# Patient Record
Sex: MALE | Race: BLACK OR AFRICAN AMERICAN | NOT HISPANIC OR LATINO | Employment: OTHER | URBAN - METROPOLITAN AREA
[De-identification: names, ages, dates, MRNs, and addresses within clinical notes are randomized per-mention and may not be internally consistent; named-entity substitution may affect disease eponyms.]

---

## 2017-08-19 ENCOUNTER — HOSPITAL ENCOUNTER (EMERGENCY)
Facility: CLINIC | Age: 37
Discharge: HOME | End: 2017-08-19
Attending: EMERGENCY MEDICINE | Admitting: EMERGENCY MEDICINE
Payer: COMMERCIAL

## 2017-08-19 VITALS
WEIGHT: 190 LBS | HEIGHT: 73 IN | TEMPERATURE: 97.8 F | BODY MASS INDEX: 25.18 KG/M2 | DIASTOLIC BLOOD PRESSURE: 96 MMHG | SYSTOLIC BLOOD PRESSURE: 145 MMHG | HEART RATE: 69 BPM | RESPIRATION RATE: 16 BRPM | OXYGEN SATURATION: 99 %

## 2017-08-19 DIAGNOSIS — S61.212A LACERATION OF RIGHT MIDDLE FINGER WITHOUT FOREIGN BODY WITHOUT DAMAGE TO NAIL, INITIAL ENCOUNTER: ICD-10-CM

## 2017-08-19 PROCEDURE — 27210282 ZZH ADHESIVE DERMABOND SKIN

## 2017-08-19 PROCEDURE — 25000128 H RX IP 250 OP 636: Performed by: EMERGENCY MEDICINE

## 2017-08-19 PROCEDURE — 99283 EMERGENCY DEPT VISIT LOW MDM: CPT | Mod: 25

## 2017-08-19 PROCEDURE — 12001 RPR S/N/AX/GEN/TRNK 2.5CM/<: CPT

## 2017-08-19 PROCEDURE — 90471 IMMUNIZATION ADMIN: CPT

## 2017-08-19 PROCEDURE — 90715 TDAP VACCINE 7 YRS/> IM: CPT | Performed by: EMERGENCY MEDICINE

## 2017-08-19 RX ADMIN — CLOSTRIDIUM TETANI TOXOID ANTIGEN (FORMALDEHYDE INACTIVATED), CORYNEBACTERIUM DIPHTHERIAE TOXOID ANTIGEN (FORMALDEHYDE INACTIVATED), BORDETELLA PERTUSSIS TOXOID ANTIGEN (GLUTARALDEHYDE INACTIVATED), BORDETELLA PERTUSSIS FILAMENTOUS HEMAGGLUTININ ANTIGEN (FORMALDEHYDE INACTIVATED), BORDETELLA PERTUSSIS PERTACTIN ANTIGEN, AND BORDETELLA PERTUSSIS FIMBRIAE 2/3 ANTIGEN 0.5 ML: 5; 2; 2.5; 5; 3; 5 INJECTION, SUSPENSION INTRAMUSCULAR at 10:54

## 2017-08-19 ASSESSMENT — ENCOUNTER SYMPTOMS
WOUND: 1
CONSTITUTIONAL NEGATIVE: 1

## 2017-08-19 NOTE — ED AVS SNAPSHOT
Emergency Department    [address] [place] [address]    Phone:  [phone]    Fax:  [phone]                                       Hay Vail   MRN: [id number]    Department:   Emergency Department   Date of Visit:  8/19/2017           After Visit Summary Signature Page     I have received my discharge instructions, and my questions have been answered. I have discussed any challenges I see with this plan with the nurse or doctor.    ..........................................................................................................................................  Patient/Patient Representative Signature      ..........................................................................................................................................  Patient Representative Print Name and Relationship to Patient    ..................................................               ................................................  Date                                            Time    ..........................................................................................................................................  Reviewed by Signature/Title    ...................................................              ..............................................  Date                                                            Time

## 2017-08-19 NOTE — ED PROVIDER NOTES
"  History     Chief Complaint:  Laceration    HPI   Hay Vail is a 37 year old right handed male who presents with concerns for a finger laceration. This morning the patient cut his right middle finger on a TV at home about an hour prior to evaluation. Otherwise the patient appears healthy. The patient is in need of tetanus update.    Allergies:  No Known Drug Allergies    Medications:    The patient is not currently taking any prescribed medications.    Past Medical History:    Back pain    Past Surgical History:    History reviewed. No pertinent past surgical history.    Family History:    The patient denies any relevant family medical history.    Social History:  Smoking Status: Current some day smoker  Smokeless Tobacco: never  Alcohol Use: no    Marital Status:  Single [1]    Review of Systems   Constitutional: Negative.    Skin: Positive for wound.   All other systems reviewed and are negative.    Physical Exam   First Vitals:  BP: (!) 145/96  Pulse: 69  Temp: 97.8  F (36.6  C)  Resp: 16  Height: 185.4 cm (6' 1\")  Weight: 86.2 kg (190 lb)  SpO2: 99 %      Physical Exam  Nursing note and vitals reviewed.  Constitutional:  Appears well-developed and well-nourished.   Cardiovascular:  Normal rate, regular rhythm, no murmur   Pulmonary/Chest: Breath sounds are clear and equal without wheezes or crackles.  Musculoskeletal:  Right long finger exam, 2.0 cm linear laceration to the radial aspect of the    long finger near the DIP joint, no deep structure involvement, no active    Bleeding, sensation is intact distally, normal flexion and extension and    the DIP and PIP joints  Skin:    Skin is warm and dry. No rash noted. No pallor.     Emergency Department Course   Procedures:  PROCEDURE: Laceration Repair    LACERATION: A simple,clean/ 2.0 cm laceration.    LOCATION: right long finger    FUNCTION: Sensation, circulation, motor, and tendon function are intact.    PREPARATION: Cleansed with normal Saline "     DEBRIDEMENT: No debridement. Wound explored and no foreign body found.     CLOSURE: Wound was closed in Dermabond    Interventions:  1054 Tdap, 0.5 mL, intramuscular     Emergency Department Course:  Nursing notes and vitals reviewed.  I performed an exam of the patient as documented above.     Medicine administered as documented above.     1110 I performed a laceration repair with Dermabond.      Findings and plan explained to the Patient. Patient discharged home with instructions regarding supportive care, medications, and reasons to return. The importance of close follow-up was reviewed.    I personally answered all related questions prior to discharge.     Impression & Plan      Medical Decision Making:  The patient presented with a laceration on his right long finger.  The wound was carefully evaluated and explored.  The laceration was closed with Dermabond as noted herein.  There is no evidence of muscular, tendon, or bony damage with this laceration.  Possible complications (infection, scarring) were reviewed with the patient.  I also discussed signs of infection including redness, warmth, foul-smelling drainage and worsening pain and instructed the patient to return promptly to the ER for re-evaluation should any of these develop.    Diagnosis:    ICD-10-CM    1. Laceration of right middle finger without foreign body without damage to nail, initial encounter S61.212A      Disposition:  discharged to home    IJose Rafael, am serving as a scribe on 8/19/2017 at 10:22 AM to personally document services performed by Brittney Swan MD based on my observations and the provider's statements to me.     Jose Rafael Joseph  8/19/2017    EMERGENCY DEPARTMENT       Brittney Swan MD  08/19/17 1311

## 2017-08-19 NOTE — ED AVS SNAPSHOT
Emergency Department    [address] [facility] [address]    Phone:  [phone]    Fax:  [phone]                                       Hay Vail   MRN: [id number]    Department:   Emergency Department   Date of Visit:  8/19/2017           Patient Information     Date Of Birth          1980        Your diagnoses for this visit were:     Laceration of right middle finger without foreign body without damage to nail, initial encounter        You were seen by Brittney Swan MD.      Follow-up Information     Follow up with  Emergency Department.    Specialty:  EMERGENCY MEDICINE    Why:  As needed for signs of wound infection    Contact information:    [address] [facility] [address] [phone]      Discharge References/Attachments     LACERATION, EXTREMITY: SKIN GLUE (ENGLISH)      24 Hour Appointment Hotline       To make an appointment at any [facility], call [phone] ([phone]). If you don't have a family doctor or clinic, we will help you find one. [facility] (formerly [facility]3] are conveniently located to serve the needs of you and your family.             Review of your medicines      Notice     You have not been prescribed any medications.            Orders Needing Specimen Collection     None      Pending Results     No orders found from 8/17/2017 to 8/20/2017.            Pending Culture Results     No orders found from 8/17/2017 to 8/20/2017.            Pending Results Instructions     If you had any lab results that were not finalized at the time of your Discharge, you can call the ED Lab Result RN at [phone]. You will be contacted by this team for any positive Lab results or changes in treatment. The nurses are available 7 days a week from 10A to 6:30P.  You can leave a message 24 hours per day and they will return your call.        Test Results From Your Hospital Stay               Clinical Quality Measure: Blood Pressure Screening   "   Your blood pressure was checked while you were in the emergency department today. The last reading we obtained was  BP: (!) 145/96 . Please read the guidelines below about what these numbers mean and what you should do about them.  If your systolic blood pressure (the top number) is less than 120 and your diastolic blood pressure (the bottom number) is less than 80, then your blood pressure is normal. There is nothing more that you need to do about it.  If your systolic blood pressure (the top number) is 120-139 or your diastolic blood pressure (the bottom number) is 80-89, your blood pressure may be higher than it should be. You should have your blood pressure rechecked within a year by a primary care provider.  If your systolic blood pressure (the top number) is 140 or greater or your diastolic blood pressure (the bottom number) is 90 or greater, you may have high blood pressure. High blood pressure is treatable, but if left untreated over time it can put you at risk for heart attack, stroke, or kidney failure. You should have your blood pressure rechecked by a primary care provider within the next 4 weeks.  If your provider in the emergency department today gave you specific instructions to follow-up with your doctor or provider even sooner than that, you should follow that instruction and not wait for up to 4 weeks for your follow-up visit.        Thank you for choosing [facility]       Thank you for choosing [facility] for your care. Our goal is always to provide you with excellent care. Hearing back from our patients is one way we can continue to improve our services. Please take a few minutes to complete the written survey that you may receive in the mail after you visit with us. Thank you!        [employer] Information     [employer] lets you send messages to your doctor, view your test results, renew your prescriptions, schedule appointments and more. To sign up, go to [email or web address] . Click on \"Log in\" " "on the left side of the screen, which will take you to the Welcome page. Then click on \"Sign up Now\" on the right side of the page.     You will be asked to enter the access code listed below, as well as some personal information. Please follow the directions to create your username and password.     Your access code is: [email or web address]  Expires: 2017 11:18 AM     Your access code will  in 90 days. If you need help or a new code, please call your [facility] clinic or [phone].        Care EveryWhere ID     This is your Care EveryWhere ID. This could be used by other organizations to access your [facility] medical records  [id number]        Equal Access to Services     CAROLYN PEARL : Kala Sahu, walucille lynn, qajuan diego kaalmalisha chu, brandon martinez . So [facility] [phone].    ATENCIÓN: Si habla español, tiene a barba disposición servicios gratuitos de asistencia lingüística. Llame al [phone].    We comply with applicable federal civil rights laws and [place] laws. We do not discriminate on the basis of race, color, national origin, age, disability sex, sexual orientation or gender identity.            After Visit Summary       This is your record. Keep this with you and show to your community pharmacist(s) and doctor(s) at your next visit.                  "

## 2021-06-02 ENCOUNTER — RECORDS - HEALTHEAST (OUTPATIENT)
Dept: ADMINISTRATIVE | Facility: CLINIC | Age: 41
End: 2021-06-02

## 2024-07-18 ENCOUNTER — HOSPITAL ENCOUNTER (EMERGENCY)
Facility: CLINIC | Age: 44
Discharge: HOME | End: 2024-07-18
Attending: EMERGENCY MEDICINE | Admitting: EMERGENCY MEDICINE

## 2024-07-18 VITALS
SYSTOLIC BLOOD PRESSURE: 138 MMHG | DIASTOLIC BLOOD PRESSURE: 79 MMHG | HEART RATE: 98 BPM | RESPIRATION RATE: 16 BRPM | TEMPERATURE: 98.2 F | OXYGEN SATURATION: 99 %

## 2024-07-18 DIAGNOSIS — S41.111A LACERATION OF RIGHT UPPER EXTREMITY, INITIAL ENCOUNTER: ICD-10-CM

## 2024-07-18 PROCEDURE — 99282 EMERGENCY DEPT VISIT SF MDM: CPT

## 2024-07-18 PROCEDURE — 12002 RPR S/N/AX/GEN/TRNK2.6-7.5CM: CPT

## 2024-07-18 RX ORDER — IBUPROFEN 600 MG/1
600 TABLET, FILM COATED ORAL ONCE
Status: COMPLETED | OUTPATIENT
Start: 2024-07-18 | End: 2024-07-18

## 2024-07-18 RX ORDER — ACETAMINOPHEN 500 MG
1000 TABLET ORAL ONCE
Status: COMPLETED | OUTPATIENT
Start: 2024-07-18 | End: 2024-07-18

## 2024-07-18 ASSESSMENT — COLUMBIA-SUICIDE SEVERITY RATING SCALE - C-SSRS
2. HAVE YOU ACTUALLY HAD ANY THOUGHTS OF KILLING YOURSELF IN THE PAST MONTH?: NO
6. HAVE YOU EVER DONE ANYTHING, STARTED TO DO ANYTHING, OR PREPARED TO DO ANYTHING TO END YOUR LIFE?: NO
1. IN THE PAST MONTH, HAVE YOU WISHED YOU WERE DEAD OR WISHED YOU COULD GO TO SLEEP AND NOT WAKE UP?: NO

## 2024-07-18 ASSESSMENT — ACTIVITIES OF DAILY LIVING (ADL): ADLS_ACUITY_SCORE: 35

## 2024-07-18 NOTE — ED TRIAGE NOTES
Last tdap in 2017   Triage Assessment (Adult)       Row Name 07/18/24 0442          Respiratory WDL    Respiratory WDL WDL        Cardiac WDL    Cardiac Rhythm NSR        Cognitive/Neuro/Behavioral WDL    Cognitive/Neuro/Behavioral WDL WDL

## 2024-07-18 NOTE — ED TRIAGE NOTES
Pt reports he was working on his car when right wrist hit the trim of the car around 2100. Pt reports numbness to right thumb.      Triage Assessment (Adult)       Row Name 07/18/24 0442          Respiratory WDL    Respiratory WDL WDL        Cardiac WDL    Cardiac Rhythm NSR        Cognitive/Neuro/Behavioral WDL    Cognitive/Neuro/Behavioral WDL WDL

## 2024-07-18 NOTE — Clinical Note
Hay Vail was seen and treated in our emergency department on 7/18/2024.  He may return to work on 07/19/2024.  Mr. Vail was seen in the emergency department overnight. Please excuse him from work.  Please allow him to keep his wound covered to prevent infection.     If you have any questions or concerns, please don't hesitate to call.      Nelly Rivas MD

## 2024-07-18 NOTE — DISCHARGE INSTRUCTIONS
*Keep the lacerations clean.  You may wash with soap and water and apply neosporin/bacitracin.  No soaking or swimming.  *Tylenol or motrin as directed as needed for pain.  *Follow-up with your doctor for suture removal in ~10-12 days.  *Return if any symptoms of infection including fever, worsening pain, foul-smelling drainage, spreading redness or warmth or worse in any way.    Discharge Instructions  Laceration (Cut)    You were seen today for a laceration (cut).  Your provider examined your laceration for any problems such a buried foreign body (like glass, a splinter, or gravel), or injury to blood vessels, tendons, and nerves.  Your provider may have also rinsed and/or scrubbed your laceration to help prevent an infection. It may not be possible to find all problems with your laceration on the first visit; occasionally foreign bodies or a tendon injury can go undetected.    Your laceration may have been closed in one of several ways:  No closure: many wounds will heal just fine without closure.  Stitches: regular stitches that require removal.  Staples: skin staples are often used in the scalp/head.  Wound adhesive (glue): skin glue can be used for certain lacerations and doesn t require removal.  Wound strips (aka Butterfly bandages or steri-strips): these are bandages that help to close a wound.  Absorbable stitches:  dissolving  stitches that go away on their own and usually don t require removal.    A small percentage of wounds will develop an infection regardless of how well the wound is cared for. Antibiotics are generally not indicated to prevent an infection so are only given for a small number of high-risk wounds. Some lacerations are too high risk to close, and are left open to heal because closure can increase the likelihood that an infection will develop.    Remember that all lacerations, no matter how expertly repaired, will cause scarring. We consider many factors, techniques, and materials, in  our efforts to provide the best possible cosmetic outcome.    Generally, every Emergency Department visit should have a follow-up clinic visit with either a primary or a specialty clinic/provider. Please follow-up as instructed by your emergency provider today.     Return to the Emergency Department right away if:  You have more redness, swelling, pain, drainage (pus), a bad smell, or red streaking from your laceration as these symptoms could indicate an infection.  You have a fever of 100.4 F or more.  You have bleeding that you cannot stop at home. If your cut starts to bleed, hold pressure on the bleeding area with a clean cloth or put pressure over the bandage.  If the bleeding does not stop after using constant pressure for 30 minutes, you should return to the Emergency Department for further treatment.  An area past the laceration is cool, pale, or blue compared with the other side, or has a slower return of color when squeezed.  Your dressing seems too tight or starts to get uncomfortable or painful. For children, signs of a problem might be irritability or restlessness.  You have loss of normal function or use of an area, such as being unable to straighten or bend a finger normally.  You have a numb area past the laceration.    Return to the Emergency Department or see your regular provider if:  The laceration starts to come open.   You have something coming out of the cut or a feeling that there is something in the laceration.  Your wound will not heal, or keeps breaking open. There can always be glass, wood, dirt or other things in any wound.  They will not always show up, even on x-rays.  If a wound does not heal, this may be why, and it is important to follow-up with your regular provider.    Home Care:  Take your dressing off in 12-24 hours, or as instructed by your provider, to check your laceration. Remove the dressing sooner if it seems too tight or painful, or if it is getting numb, tingly, or pale  past the dressing.  Gently wash your laceration 1-2 times daily with clean water and mild soap. It is okay to shower or run clean water over the laceration, but do not let the laceration soak in water (no swimming).  If your laceration was closed with wound adhesive or strips: pat it dry and leave it open to the air. For all other repairs: after you wash your laceration, or at least 2 times a day, apply antibiotic ointment (such as Neosporin  or Bacitracin ) to the laceration, then cover it with a Band-Aid  or gauze.  Keep the laceration clean. Wear gloves or other protective clothing if you are around dirt.    Follow-up for removal:  If your wound was closed with staples or regular stitches, they need to be removed according to the instructions and timeline specified by your provider today.  If your wound was closed with absorbable ( dissolving ) sutures, they should fall out, dissolve, or not be visible in about one week. If they are still visible, then they should be removed according to the instructions and timeline specified by your provider today.    Scars:  To help minimize scarring:  Wear sunscreen over the healed laceration when out in the sun.  Massage the area regularly once healed.  You may apply Vitamin E to the healed wound.  Wait. Scars improve in appearance over months and years.    If you were given a prescription for medicine here today, be sure to read all of the information (including the package insert) that comes with your prescription.  This will include important information about the medicine, its side effects, and any warnings that you need to know about.  The pharmacist who fills the prescription can provide more information and answer questions you may have about the medicine.  If you have questions or concerns that the pharmacist cannot address, please call or return to the Emergency Department.       Remember that you can always come back to the Emergency Department if you are not able  to see your regular provider in the amount of time listed above, if you get any new symptoms, or if there is anything that worries you.

## 2024-07-19 NOTE — ED PROVIDER NOTES
History     Chief Complaint:  Laceration       HPI   Hay Vail is a 44 year old male with wearing something that is part of the axle when he scraped his arm on the right under the undercarriage of the car and sustained a laceration.  It occurred late on the previous evening.  He completed that he bandaged it but he continued to have pain and comes for evaluation of whether this requires closure or not.  He reports that his last tetanus was in the last 2 years.  No pulsatile bleeding.  He feels pain and tingling extending down his thumb.  Normal strength in his hand..    Independent Historian:    None    Review of External Notes:  I reviewed [facility].  Patient has had tetanus updated within the last 10 years per [facility].    Allergies:  No Known Allergies     Medications:    No current outpatient medications on file.      Past Medical History:    Past Medical History:   Diagnosis Date    Back pain        Past Surgical History:    No past surgical history on file.     Physical Exam   Patient Vitals for the past 24 hrs:   BP Temp Pulse Resp SpO2   07/18/24 0536 138/79 -- 98 -- 99 %   07/18/24 0442 (!) 142/93 98.2  F (36.8  C) 101 16 98 %        Physical Exam  General: Well-nourished, no acute distress  Eyes: PERRL, conjunctivae pink no scleral icterus or conjunctival injection  ENT:  Moist mucus membranes  Respiratory:  No respiratory distress  CV: Normal rate.  Symmetric radial pulse and distal capillary refill and temperature.  Skin: 3.0cm linear laceration over the radial aspect of the right wrist.  No active bleeding.  No pulsatile bleeding.  No exposed tendons or nerves.  Lacerations are superficial.  Musculoskeletal: No peripheral edema or calf tenderness.  Normal strength with flexion extension of the thumb and all fingers of the right hand.  Neuro: Alert and oriented to person/place/time.  Normal distal sensation to light touch over the thumb but patient describes tingling sensation.  Psychiatric: Normal  affect      Emergency Department Course       Procedures     Laceration Repair      Procedure: Laceration Repair    Indication: Laceration    Consent: Verbal    Tetanus status reviewed    Location: Right Forearm     Length: 3.0 cm    Preparation: Irrigation with Sterile Saline.    Anesthesia/Sedation: Lidocaine - 1%      Treatment/Exploration: Wound explored, no foreign bodies found     Closure: The wound was closed with one layer. Skin/superficial layer was closed with 5 x 4-0 Nylon using Interrupted sutures.     Patient Status: The patient tolerated the procedure well: Yes. There were no complications.      Emergency Department Course & Assessments:       Interventions:  Medications   ibuprofen (ADVIL/MOTRIN) tablet 600 mg (600 mg Oral Not Given 7/18/24 0535)   acetaminophen (TYLENOL) tablet 1,000 mg (1,000 mg Oral Not Given 7/18/24 0535)        Independent Interpretation  None    Assessments, Consults, Discussions of ManagementTests:   I evaluated and assessed patient.  Placed local anesthetic.  Repaired laceration.    Social Determinants of Health affecting care:  None    Disposition:  The patient was discharged to home.     Impression & Plan    CMS Diagnoses: None    Medical Decision Making:  The patient presented with a laceration.  The wound was carefully evaluated and explored.  The laceration was closed with sutures/staples as noted herein.  There is no evidence of muscular, tendon, or bony damage with this laceration.  Possible complications (infection, scarring) were reviewed with the patient.  Follow up with primary care will be indicated for suture/staple removal as noted in the discharge section.  I also discussed signs of infection including redness, warmth, foul-smelling drainage and worsening pain and instructed the patient to return promptly to the ER for re-evaluation should any of these develop.    Critical Care:  None.    Diagnosis:    ICD-10-CM    1. Laceration of right upper extremity, initial  encounter  S41.111A            Discharge Medications:  There are no discharge medications for this patient.         7/19/2024   No att. providers found          Nelly Rivas MD  07/19/24 1415

## 2025-05-06 ENCOUNTER — HOSPITAL ENCOUNTER (EMERGENCY)
Facility: CLINIC | Age: 45
Discharge: HOME | End: 2025-05-06
Attending: EMERGENCY MEDICINE | Admitting: EMERGENCY MEDICINE
Payer: COMMERCIAL

## 2025-05-06 ENCOUNTER — APPOINTMENT (OUTPATIENT)
Dept: GENERAL RADIOLOGY | Facility: CLINIC | Age: 45
End: 2025-05-06
Attending: EMERGENCY MEDICINE
Payer: COMMERCIAL

## 2025-05-06 VITALS
SYSTOLIC BLOOD PRESSURE: 135 MMHG | OXYGEN SATURATION: 99 % | RESPIRATION RATE: 16 BRPM | TEMPERATURE: 97.8 F | DIASTOLIC BLOOD PRESSURE: 87 MMHG | HEART RATE: 88 BPM

## 2025-05-06 DIAGNOSIS — M25.512 ACUTE PAIN OF LEFT SHOULDER: ICD-10-CM

## 2025-05-06 PROCEDURE — 73030 X-RAY EXAM OF SHOULDER: CPT | Mod: LT

## 2025-05-06 PROCEDURE — 99283 EMERGENCY DEPT VISIT LOW MDM: CPT

## 2025-05-06 ASSESSMENT — ACTIVITIES OF DAILY LIVING (ADL)
ADLS_ACUITY_SCORE: 41

## 2025-05-06 ASSESSMENT — COLUMBIA-SUICIDE SEVERITY RATING SCALE - C-SSRS
2. HAVE YOU ACTUALLY HAD ANY THOUGHTS OF KILLING YOURSELF IN THE PAST MONTH?: NO
1. IN THE PAST MONTH, HAVE YOU WISHED YOU WERE DEAD OR WISHED YOU COULD GO TO SLEEP AND NOT WAKE UP?: NO
6. HAVE YOU EVER DONE ANYTHING, STARTED TO DO ANYTHING, OR PREPARED TO DO ANYTHING TO END YOUR LIFE?: NO

## 2025-05-06 NOTE — Clinical Note
Hay Vail was seen and treated in our emergency department on 5/6/2025.  He may return to work on 05/09/2025.       If you have any questions or concerns, please don't hesitate to call.      Marianne Velasquez MD

## 2025-05-07 NOTE — DISCHARGE INSTRUCTIONS
Discharge Instructions  Extremity Injury    You were seen today for an injury to an extremity (arm, hand, leg, or foot). You may have a bruise, strain, or fracture (broken bone).    Generally, every Emergency Department visit should have a follow-up clinic visit with either a primary or a specialty clinic/provider. Please follow-up as instructed by your emergency provider today.  Return to the Emergency Department right away if:  Your pain seems to change or get worse or there is pain in a new area that wasn t evaluated today.  Your extremity becomes pale, cool, blue, or numb or tingling past the injury.  You have more drainage, redness or pain in the area of the cut or abrasion.  You have pain that you cannot control with the medicine recommended or prescribed here, or you have pain that seems too much for your injury.  Your child (who is injured) will not stop crying or is much more fussy than normal.  You have new symptoms or anything that worries you.    What to Expect:  Your swelling and pain may be worse the day after your injury, but should not be severe and should start getting better after that. You should not have new symptoms and your pain should not get worse.  You may start to get a bruise over the injured area or below the injured area (bruising can follow gravity).  Your movement and strength should get better with time.  Some injuries may not show up until after you have left the Emergency Department so it is important to follow-up as directed.  Your injury may prevent you from working.  Follow-up with your regular provider to get a work release note.  Pain medications or your injury may make it unsafe to drive or operate machinery.    Home Care:  RICE: Rest, Ice, Compression, Elevation  Rest: Rest your injured area for at least 1-2 days. After that you may start using your extremity again as long as there is not too much pain.   Ice: Apply ice your injured area for 15 minutes at a time, at least 3  times a day. Use a cloth between the ice bag and your skin to prevent frostbite. Do not sleep with an ice pack or heating pad on, since this can cause burns or skin injury.  Compression: You may use an elastic bandage (Ace  Wrap) if it makes you more comfortable. Wrap it just tight enough to provide light compression, like a new pair of socks feels. Loosen the bandage if you have swelling past the bandage.  Elevation: Raise the injured area above the level of your heart as much as possible in the first 1-2 days.    Use Tylenol  (acetaminophen), Motrin (ibuprofen), or Advil  (ibuprofen) for your pain unless you have an allergy or are told not to use these medications by your provider.  Take the medications as instructed on the package. Tylenol  (acetaminophen) is in many prescription medicines and non-prescription medicines--check all of your medicines to be sure you aren t taking more than 3000 mg per day.  Please follow any other instructions that were discussed with you by your provider.    Stretching/Exercises:  You may have been provided with instructions for stretching or exercises. If your injury was to your arm or shoulder and your provider put you in a sling or an immobilizer, it is important that you take off your immobilizer within 3 days and stretch/move your shoulder, unless your provider specifically tells you to not move your shoulder.  This is to prevent further injury such as a  frozen shoulder .     If you were given a prescription for medicine here today, be sure to read all of the information (including the package insert) that comes with your prescription.  This will include important information about the medicine, its side effects, and any warnings that you need to know about.  The pharmacist who fills the prescription can provide more information and answer questions you may have about the medicine.  If you have questions or concerns that the pharmacist cannot address, please call or return to  the Emergency Department.     Remember that you can always come back to the Emergency Department if you are not able to see your regular provider in the amount of time listed above, if you get any new symptoms, or if there is anything that worries you.

## 2025-05-07 NOTE — ED TRIAGE NOTES
Patient presents with left should pain that started around 1 month ago.  He denies injury or trauma.  Pain worse with laying on it and with repetitive movements.       Triage Assessment (Adult)       Row Name 05/06/25 1958          Triage Assessment    Airway WDL WDL        Respiratory WDL    Respiratory WDL WDL        Skin Circulation/Temperature WDL    Skin Circulation/Temperature WDL WDL        Cardiac WDL    Cardiac WDL WDL        Peripheral/Neurovascular WDL    Peripheral Neurovascular WDL WDL        Cognitive/Neuro/Behavioral WDL    Cognitive/Neuro/Behavioral WDL WDL

## 2025-05-07 NOTE — ED PROVIDER NOTES
"  Emergency Department Note      History of Present Illness     Chief Complaint   Shoulder Pain      HPI   Hay Vail is a 44 year old male who presents to the ED for evaluation of shoulder pain. Patient reports that for the last month he has been experiencing diffuse left shoulder pain. He is unsure of the origin, but denies any trauma to the area. He believes it may be related to his work at [employer] works. He has noticed some weakness, primarily in the mornings. He finds that sometimes he has to use his other arm to \"get it going\" and the weakness will pass after some time. However, this weakness has been lingering longer and occurring more frequently recently. He denies redness, swelling, neck pain, or neck injuries. He has not been using tylenol or ibuprofen.    Independent Historian   None    Review of External Notes   None    Past Medical History     Medical History and Problem List   The patient denies any significant past medical history.    Medications   The patient is not currently taking any regular medications.     Surgical History   No past surgical history on file.    Physical Exam     Patient Vitals for the past 24 hrs:   BP Temp Temp src Pulse Resp SpO2   05/06/25 1959 135/87 97.8  F (36.6  C) Oral 88 16 99 %     Physical Exam  General: Resting comfortably when I enter the room  Eyes: Pupils equal  ENT:  Moist mucus membranes  Respiratory:  Equal chest rise, no respiratory distress  CV: Normal rate and rhythm  GI:  No distension  Skin: Warm, dry.  No rashes or petechiae  Musculoskeletal: Compartments are soft. Sensation intact. Pulses intact. Brisk capillary refill.  No focal tenderness to palpation of the left shoulder.  No swelling.  Limited range of motion secondary to pain especially in internal rotation and abduction.    Diagnostics     Lab Results   Labs Ordered and Resulted from Time of ED Arrival to Time of ED Departure - No data to display    Imaging   XR Shoulder Left G/E 3 Views   Final " Result   IMPRESSION: Normal joint spaces and alignment. No fracture. Small calcification projecting to the coracoid process, which could represent a focus of hydroxyapatite deposition.        Independent Interpretation   X-ray shoulder left shows no acute fracture or dislocation.    ED Course      Medications Administered   Medications - No data to display    Procedures   Procedures     Discussion of Management   None    ED Course   ED Course as of 05/06/25 2211   Tue May 06, 2025   2128 I obtained the history and performed the examination as described.    2207 I rechecked and updated the patient.   Discussed imaging results and discharge.       Additional Documentation  None    Medical Decision Making / Diagnosis     CMS Diagnoses: None    MIPS       None    [facility]   Hay Vail is a 44 year old male presenting to the emergency department with a complaint of left shoulder pain for the past month.  On exam patient is not in any acute distress.  There is no point tenderness on his shoulder.  He does have difficulty with abduction above his head, and internal rotation of his shoulder.  Neurovascularly he is intact.  There are no deformities, swelling, bruising, abrasions.  Patient may have an injury to his rotator cuff.  We did discuss physical therapy.  X-ray does not show any fracture or dislocation.  I will have the patient follow-up with orthopedics for further evaluation, he can use Tylenol and ibuprofen for pain.  He is also given a work note.  Patient is discharged.    Disposition   The patient was discharged.     Diagnosis     ICD-10-CM    1. Acute pain of left shoulder  M25.512            Discharge Medications   New Prescriptions    No medications on file         Scribe Disclosure:  I, Peña Clifton, am serving as a scribe at 9:36 PM on 5/6/2025 to document services personally performed by Marianne Velasquez MD based on my observations and the provider's statements to me.        Marianne Velasquez,  MD  05/07/25 0032